# Patient Record
Sex: FEMALE | Race: WHITE
[De-identification: names, ages, dates, MRNs, and addresses within clinical notes are randomized per-mention and may not be internally consistent; named-entity substitution may affect disease eponyms.]

---

## 2018-03-15 ENCOUNTER — HOSPITAL ENCOUNTER (EMERGENCY)
Dept: HOSPITAL 80 - CED | Age: 53
Discharge: HOME | End: 2018-03-15
Payer: MEDICAID

## 2018-03-15 VITALS
SYSTOLIC BLOOD PRESSURE: 125 MMHG | RESPIRATION RATE: 20 BRPM | TEMPERATURE: 98.6 F | HEART RATE: 92 BPM | DIASTOLIC BLOOD PRESSURE: 78 MMHG | OXYGEN SATURATION: 96 %

## 2018-03-15 DIAGNOSIS — F17.200: ICD-10-CM

## 2018-03-15 DIAGNOSIS — J20.9: Primary | ICD-10-CM

## 2018-03-15 NOTE — EDPHY
H & P


Time Seen by Provider: 03/15/18 21:33


HPI/ROS: 





CHIEF COMPLAINT:  Cough and shortness of breath





HISTORY OF PRESENT ILLNESS:  52-year-old half pack-a-day smoker since age 39 

presents with 3 weeks of cough.  Started with sore throat and diffuse myalgias, 

congestion, and cough.  Progressed to wheezing and cough productive of green 

sputum which is severe and worse with exertion.  Associated with bilateral 

posterior chest pain which is only present when she coughs.  No hemoptysis and 

no leg swelling.  Continues to smoke but only 3 cigarettes today.





REVIEW OF SYSTEMS:


Eye: no change in vision


ENT:  Mild sore throat which is improving


Cardiac: no chest pain or syncope


Pulmonary:  HPI


Abdomen: no vomiting, diarrhea, abdominal pain


Musculoskeletal:  Still has soreness in her right leg after a orthopedic 

procedure in May of 2017.  She had some soreness in her right upper arm today 

which is worse with moving it or palpation


Skin: no rash or urticaria


Neuro: no headache


Constitutional:  Associated fever and chills


: no urinary symptoms





A comprehensive 10 point review of systems is otherwise negative aside from 

elements mentioned in the history of present illness.





PAST MEDICAL HISTORY:  Lexa in her right tibia in May of 2017, has had 

bronchospasm and oral steroids before by her primary care practice in [place] 

last year.  Negative for diabetes or asthma.  Negative for coronary disease.





Social history:  Tobacco smoker about a half pack per day, 3 cigarettes 

yesterday.  No recent trauma or immobilization.





General Appearance: Alert and conversant, cooperative.


Eyes: No scleral  icterus. 


ENT, Mouth: Normal mucous membranes.  No angioedema or trismus.


Respiratory:  Bilateral wheezing expiratory phase is increased, speaks in full 

sentences, breath sounds are equal.  No crepitus.


Cardiovascular:  Regular rate and rhythm.


Gastrointestinal:  Abdomen is soft and non tender.


Neurological: Alert, face symmetric, normal motor and sensory in extremities. 


Skin: Warm and dry, no rashes.  No urticaria.


Musculoskeletal:  No calf tenderness.  Patient has some soreness in her right 

upper arm which she moves her shoulder and some tenderness to direct palpation 

of her biceps and deltoid.


Psychiatric: Not agitated.





Emergency Department course/MDM:





Patient presents with 3 weeks of cough and green sputum with history of tobacco 

smoking, which treat empirically with azithromycin.


DuoNeb and albuterol neb.  She does have an albuterol inhaler given to her by 

her [place] physician assistant last week.


Steroids discussed and consented.


Likely viral primary Respiratory infection which has progressed bacterial.  We 

discussed not doing chest x-ray since I plan to treat her with antibiotics, 

patient is in agreement. 


2219:  Feels better, chest clear, full sentences, no retractions or extra work 

of breathing.  She would like to go home which I think is reasonable.


Smoking Status: Heavy smoker


Constitutional: 


 Initial Vital Signs











Temperature (C)  37.1 C   03/15/18 21:35


 


Heart Rate  99   03/15/18 21:35


 


Respiratory Rate  22 H  03/15/18 21:35


 


Blood Pressure  129/89 H  03/15/18 21:35


 


O2 Sat (%)  94   03/15/18 21:35








 











O2 Delivery Mode               Room Air














Allergies/Adverse Reactions: 


 





No Known Allergies Allergy (Unverified 03/15/18 21:24)


 








Home Medications: 














 Medication  Instructions  Recorded


 


Advil  03/15/18


 


Azithromycin 250 mg PO DAILY #4 tablet 03/15/18


 


D-Methorphan/PE/Acetaminophen  03/15/18





[Kandis-Trinway Plus Sinus-Cough]  


 


Inhaler  03/15/18


 


predniSONE [prednisone 20mg (RX)] 60 mg PO DAILY 5 Days  tab 03/15/18














Medical Decision Making


Differential Diagnosis: 





Differential diagnosis considered for shortness of breath including but not 

limited to pulmonary infectious process, COPD, asthma, pulmonary embolus and 

congestive heart failure.





- Data Points


Medications Given: 


 








Discontinued Medications





Albuterol (Proventil Neb)  3 ml I[facility] EDNOW ONE


   Stop: 03/15/18 21:43


   Last Admin: 03/15/18 21:51 Dose:  3 ml


Albuterol/Ipratropium (Duoneb)  3 ml [facility] EDNOW ONE


   Stop: 03/15/18 21:43


   Last Admin: 03/15/18 21:50 Dose:  3 ml


Azithromycin (Zithromax)  500 mg PO EDNOW ONE


   PRN Reason: Protocol


   Stop: 03/15/18 21:43


   Last Admin: 03/15/18 21:50 Dose:  500 mg


Prednisone (Prednisone)  60 mg PO EDNOW ONE


   Stop: 03/15/18 21:43


   Last Admin: 03/15/18 21:49 Dose:  60 mg








Departure





- Departure


Disposition: Home, Routine, Self-Care


Clinical Impression: 


Acute bronchitis


Qualifiers:


 Bronchitis organism: unspecified organism Qualified Code(s): J20.9 - Acute 

bronchitis, unspecified





Condition: Good


Instructions:  Prednisone (By mouth), Azithromycin (By mouth), Acute Bronchitis 

(ED), Bronchospasm (ED)


Additional Instructions: 


Need to follow-up with your primary care clinic or referral primary care 

physician here in the next 1-2 weeks to ensure that your getting better.


Referrals: 


Ashley Lares MD [Medical Doctor] - As per Instructions


[facility], clinic [Other] - As per Instructions


Prescriptions: 


Azithromycin 250 mg PO DAILY #4 tablet


predniSONE [prednisone 20mg (RX)] 60 mg PO DAILY 5 Days  tab